# Patient Record
(demographics unavailable — no encounter records)

---

## 2018-01-18 NOTE — RAD
CHEST 2 VIEWS:

 

HISTORY: 

Atrial fibrillation.

 

COMPARISON: 

Chest radiograph 3/20/17.

 

FINDINGS: 

The heart size is enlarged.  The hilum is enlarged bilaterally.  No pneumothorax.  No large effusion.
  Mild pulmonary venous congestion.

 

IMPRESSION: 

Cardiomegaly, pulmonary arterial hypertension, and mild pulmonary venous congestion.

 

POS: [facility]

## 2018-05-22 NOTE — RAD
PELVIS 1 VIEW:

 

HISTORY: 

Fall.  Trauma.

 

COMPARISON: 

None.

 

FINDINGS: 

Limited evaluation of the left inferior suprapubic rami.  Questionable left superior pubic ramus frac
ture.  Iliac wings appear to be intact.  On the single projection, contour of both femoral heads are 
maintained.  Hip joint space is symmetric.  Postsurgical changes of the lower lumbar spine are noted.


 

IMPRESSION: 

Left superior pubic ramus fracture suspected.  Evaluation of pelvis is limited.  Additional imaging i
s warranted.

 

POS: [facility]

## 2018-07-20 NOTE — HP
DATE OF ADMISSION:  2018

 

DATE OF HISTORY AND PHYSICAL:  2018

 

HISTORY OF PRESENT ILLNESS:  The patient is a very pleasant 72-year-old white male electively admitte
d by Dr. Pepe to [facility] for a total right knee.  Postoperatively, he has done fairly we
ll, but his knee is somewhat red and inflamed.  They have been packing it on ice, she is not on any a
ntibiotics and no cultures have grown anything.

 

SUBJECTIVE:  The patient has been moved here because he needs further physical therapy and occupation
al therapy.

 

PAST MEDICAL HISTORY:  Reveals patient has:

1.  Diabetes.

2.  Hypertension.

3.  An enlarged blood vessel in his head that pushes against nerve and gives him facial nerve pain.

4.  Coronary artery disease.

5.  Atrial fibrillation.

 

PAST SURGICAL HISTORY:

1.  Back surgery.

2.  Total left knee on 2012.

3.  ESS on 2014.

4.  Cholecystectomy on 2015.

5.  Total right knee on 2018.

 

FAMILY HISTORY:  Reveals patient's father  in his 50s of a gunshot wound.  Patient's mother  
at 97, but she had heart disease, cancer.  The patient has 3 sons and 3 daughters that are all health
y.

 

SOCIAL HISTORY:  Reveals patient is a nonsmoker, does not drink, does not use any drugs.

 

ALLERGIES:

1.  Patient is on Tradjenta, which is critical.

2.  Invokana, urinary tract infection side effects.

3.  Actos for fluid retention.

 

REVIEW OF SYSTEMS:  The patient denies any present ear, nose, or throat problems including headaches 
at this time.  The patient denies chest pain, shortness of breath, cough, cold, or wheezing.  Cardiov
ascular:  The patient denies chest pain, palpitations, racing or skipping heartbeats.  Abdominal symp
toms:  Patient denies nausea, vomiting, diarrhea, constipation, bloody or black tarry stools.  Genito
urinary:  The patient denies frequency, urgency, but he states that his nurse told him that thought s
he saw some blood in his urine, so will get a urinalysis.  Extremities:  Reveal the patient complains
 of right knee pain.  Neurologic:  The patient denies any symptoms except occasional headache and fol
lows a neurologist for that.  Skin:  Patient denies any new skin rashes or skin lesions.  Psychiatric
:  The patient denies any signs or symptoms of anxiety or depressive disorder.

 

PRESENT MEDICATIONS:  Reveal the patient is on the following medications:

1.  Amiodarone 100 mg b.i.d.

2.  Aspirin 81 mg each day with food.

3.  Vitamin D3 of 2000 units once a day.

4.  Furosemide 20 units up to twice a day p.r.n. 2+ edema or more.

5.  Gabapentin 300 mg b.i.d.

6.  Hydrocodone 1 tab q.4 hours for 1-5 pain and 2 tabs q.4 hours for 6-10 pain, not to exceed 3000 m
g of acetaminophen.

7.  Lantus 25 mg subcu b.i.d.

8.  Lisinopril 20 mg each morning.

9.  Humalog sliding scale aggressive.

10.  Metformin 1000 mg b.i.d. with meals.

11.  Oxcarbazepine which is Trileptal 300 mg twice a day.

12.  Xarelto 20 mg each morning.

13.  Rosuvastatin 10 mg each evening.

 

PHYSICAL EXAMINATION:

GENERAL:  This is a well-developed, well-nourished, obese white male in no apparent distress at this 
time.

HEENT:  Reveals normocephalic, nontraumatic cranium.  Pupils are equally round and reactive.  Extraoc
ular movements are intact.  Nose and throat are slightly dry.

NECK:  Supple, without mass, nodes, or bruits.

CHEST:  Clear to auscultation.  No rales, no rhonchi, no wheezes are heard.

HEART:  Reveals what seems to be a regular rate and rhythm, but he may be in a very well controlled a
trial fibrillation because he does have a history of atrial fib.

ABDOMEN:  Soft, nontender, without organomegaly, normal bowel sounds are noted.  No rebound or guardi
ng is noted.  Bowel sounds are heard in all 4 quadrants.

GENITOURINARY:  Deferred.

EXTREMITIES:  No clubbing, cyanosis.  The patient has +1 edema in bilateral lower extremities.  The p
atient has right knee is examined and has some redness and fullness and slightly warm.  We will follo
w up for drainage.  He does not have staples.  He has glue.

NEUROLOGIC:  The patient is intact.

 

ASSESSMENT:

1.  Total right knee.

2.  Diabetes.

3.  Hypertension.

4.  Coronary artery disease.

5.  Nerve pain.

6.  Atrial fibrillation.

7.  Recent total right knee surgery.

8.  Pain management.

9.  Generalized weakness.

 

PLAN:

1.  Continue present medications as ordered.

2.  Monitor the patient's diabetes with Accu-Cheks a.c. and at bedtime.

3.  Monitor the patient's blood pressure closely.

4.  Monitor the patient's rate for RVR.

5.  Pain management.

6.  Physical therapy and occupational therapy.

7.  Diabetic diet.

## 2018-07-20 NOTE — PRG
DATE OF SERVICE:  07/20/2017

 

HISTORY OF PRESENT ILLNESS:  Mr. Morse is a very pleasant 72-year-old white male that had an elective 
total right knee done by Dr. Pepe.  Postoperatively, he was transferred to [facility] for physical therapy and occupational therapy.

 

The patient is still concerned that his knee is red with a little drainage.  It is red, it is not any
 redder than it was yesterday.  We have been continued with some cool packs on it.

 

Labs this morning revealed white count 6900 with no shift.  Urinalysis was done this morning which sh
owed 7-10 rbcs, but no wbcs.  Sodium is 132, potassium 4.3, BUN 14, creatinine 0.83.  Sugar this morn
ing was 189.

 

Patient does complain of some knee pain.  Seems like the pain medicine does work for him, though.

 

OBJECTIVE:

VITAL SIGNS:  Reveal blood pressure this morning 143/66, pulse 67-72, respirations 18-20, O2 sat 92%-
94% on room air.  T-max is 98.7.:

GENERAL:  This is a well-developed, well-nourished, somewhat obese white male in no apparent distress
 at this time.

HEENT:  Reveals normocephalic, nontraumatic cranium.  Pupils are equally round and reactive.  Extraoc
ular movements intact.  Nose and throat are slightly dry, but clear.

NECK:  Supple, without mass, nodes or bruits.

LUNGS:  Chest is clear to auscultation.  No rales, rhonchi or wheezes are heard.

CARDIOVASCULAR:  Reveals a regular rate controlled atrial fibrillation rhythm.

ABDOMEN:  Soft, nontender, without organomegaly, normal bowel sounds are noted.  No rebound or guardi
ng is noted.  Bowel sounds are heard in all quadrants.

GENITOURINARY:  Deferred.

EXTREMITIES:  Reveal no clubbing or cyanosis.  The patient does continue to have 1+ edema bilaterally
 in lower extremities.  The patient's right knee is examined and does have a little redness and fulln
ess, but not any worse than yesterday.  We are following for drainage if he starts having drainage.  
We will do a culture.

NEUROLOGIC:  The patient is oriented to person, place, and time.

 

IMPRESSION:

1.  Recent postoperative total right knee.

2.  Diabetes, somewhat elevated sugars at this time.

3.  Hypertension, stable.

4.  Coronary artery disease.

5.  Nerve pain.

6.  Atrial fibrillation.

7.  Recent total right knee surgery.

8.  Pain management.

9.  Generalized weakness.

 

PLAN:

1.  Continue present meds.

2.  Monitor the patient's diabetes with Accu-Cheks a.c. and at bedtime.

3.  Monitor patient's blood pressure closely.

4.  Monitor the patient's knee for redness and inflammation and cellulitis.

5.  Monitor the patient's right forearm _____.

6.  Stress ulcer prophylaxis.

7.  Decubitus precautions.

8.  Deep venous thrombosis prophylaxis.

9.  Continue physical therapy and occupational therapy.

10.  Diabetic diet.

## 2018-07-21 NOTE — PRG
DATE OF SERVICE:  07/21/2018

 

DATE OF ADMISSION:  07/19/2018

 

SUBJECTIVE:  Mr. Morse is a 72-year-old white male that had a total right knee done by Dr. Pepe at [facility].  Postoperatively, he did well and was transferred here.

 

The patient's daughters came in yesterday were upset because his knee was still red.  It has been red
 since he had a surgery.  They wanted to bring him to [place] for Dr. Pepe to see him.  They did conta
ct Dr. Pepe's office, he said to bring him if it is still red today.

 

His blood work reveals white count is actually 6,900 yesterday, it has gone down to 6,700 today.  He 
states his daughters are just overly active and worried.

 

My major concern is that sugars are still high from 198, 165, 209 and 169 this morning.  I did increa
se his Lantus or Levemir from 25 units subcu to 30 units subcu b.i.d.  We also increased his Lasix fr
om p.r.n. to 40 units every morning starting tomorrow morning.  He did get a dose of 20 this morning.


 

OBJECTIVE:

VITAL SIGNS:  This morning reveal blood pressure 158/69, pulse 94, respirations 16-22 on room air, O2
 sat 94%, T-max 98.0, pulse 77-79.

GENERAL:  He is a well-developed, well-nourished, obese white male in no apparent distress at this ti
me.

HEENT:  Reveals normocephalic, nontraumatic cranium.  Pupils are round and reactive.  Extraocular mov
ements are intact.  Nose and throat are slightly dry, but clear.

NECK:  Supple, without mass, nodes or bruits.

CHEST:  Clear to auscultation.  No rales, rhonchi, wheezes or cough is heard.

HEART:  Reveals a regular rate and rhythm.  Patient does have a history of atrial fibrillation, but i
t seems to be rate controlled and regular today.

ABDOMEN:  Obese, soft and nontender, without organomegaly.  Normal bowel sounds are noted.  No reboun
d or guarding is noted.

:  Deferred.

EXTREMITIES:  Reveal right knee seems to have a little less redness.

NEUROLOGIC:  Has quite a bit of swelling, so we will give him his Lasix 20 this morning, but we will 
give him 40 tomorrow morning.  Does not seem to be as red this morning, but it gets redder every afte
rnoon following his workouts.

NEUROLOGIC:  The patient is oriented to person, place, and time.

 

IMPRESSION:

1.  Recent postoperative total right knee by Dr. Pepe.

2.  Diabetes, still elevated, increase his Levemir to 30.

3.  Hypertension, which is still a little elevated.

4.  Coronary artery disease.

5.  Nerve pain.

6.  Atrial fibrillation.

7.  Recent total right knee.

8.  Pain management.

9.  Generalized weakness.

 

PLAN:

1.  Lasix 40 each morning starting tomorrow.

2.  Monitor his edema closely.

3.  Monitor patient diabetes with Accu-Cheks a.c. and at bedtime.

4.  Monitor the patient's blood pressure closely.

5.  Monitor the patient's knee for redness, inflammation and we will sent to [place] if it is gett
ing worse.

6.  Continue CBC daily.

7.  Monitor the patient's right forearm.

8.  Stress ulcer prophylaxis.

9.  Decubitus precautions.

10.  Deep venous thrombosis prophylaxis.

11.  Continue physical therapy and occupational therapy.

12.  Diabetic diet.

## 2018-07-22 NOTE — PRG
DATE OF SERVICE:  07/22/2018

 

SUBJECTIVE:  Mr. Morse is a very pleasant 72-year-old white male that had total right knee done by Dr. Pepe at [facility].  Postoperatively, he did well and was transferred here.  The patient's
 daughters who came in yesterday were upset, because his knee was red.  We did blood work and it was 
better than it had been.  We did blood work this morning, his white count is a little bit elevated fr
om 6.7-8.0.  His knee is not so red, but his lower leg looks like it may have an early cellulitis.  EVERARDO nunes does have a culture that is pending, but thus far has no white cells and no bacteria seen on Gram s
tain.  His sugars are still high, 169, 193, 233 and 220.

 

PHYSICAL EXAMINATION:

VITAL SIGNS:  This morning, blood pressure were 138/61, pulse 63-67, respirations 20, O2 sat 92%-93%.
  T-max 98.7.

GENERAL:  This is a well-developed, well-nourished, morbidly obese white male in no apparent distress
 at this time.  He had a recent total knee replacement on the right side and is deconditioned.

HEENT:  Reveals normocephalic, nontraumatic cranium.  Pupils are equal, round, and reactive.  Extraoc
ular movements intact.  Nose and throat are slightly dry, but clear.

NECK:  Supple, without mass, nodes or bruits.

CHEST:  Clear to auscultation.  No rales, rhonchi, wheezes or cough is heard.

CARDIOVASCULAR:  Heart reveals a regular rate and rhythm without murmurs, gallops or rubs.

The patient does have a history of atrial fibrillation, but seems rate controlled and regular today. 
 ABDOMEN:  Soft, nontender, without organomegaly.  Normal bowel sounds are noted.  No rebound or guar
ding is noted.

GENITOURINARY:  Deferred.

EXTREMITIES:  Reveal right knee is less red, but the lower leg is much more red.  It looks like he ma
y have an early cellulitis there.  Neurologically, we were giving him Lasix every morning, 40 mg.  He
 does still have some trace edema.

NEUROLOGIC:  The patient is oriented to person, place, and time.

 

ASSESSMENT:

1.  Recent postop total right knee by Dr. Pepe.

2.  Early cellulitis of lower leg, below the knee.

3.  Diabetes, elevated, his Levemir has been bumped to 30 units, continue to watch that.

4.  Hypertension, which is stable.

5.  Coronary artery disease.

6.  Nerve pain.

7.  Atrial fibrillation.

8.  Recent total right knee.

9.  Pain management.

10.  Generalized weakness.

 

PLAN:

1.  We will start the patient on Lasix 40 mg each morning that started this morning.

2.  Start the patient on Bactrim-DS 1 pill b.i.d. for his lower leg cellulitis.

3.  Monitor his edema closely.

4.  Monitor the patient's Accu-Cheks at a.c. and at bedtime.

5.  Monitor the patient's blood pressure closely.

6.  Continue to monitor the patient's knee for redness, inflammation.

7.  Continue CBC daily.

8.  Monitor the patient's right forearm.

9.  Stress ulcer prophylaxis.

10.  Decubitus precautions.

11.  Deep venous thrombosis prophylaxis.

12.  Continue physical therapy and occupational therapy.

13.  Diabetic diet.

## 2018-07-23 NOTE — PRG
DATE OF SERVICE:  07/23/2018

 

SUBJECTIVE:  Mr. Morse is a 72-year-old very pleasant white male that had a total right knee done by JOE Pepe at [facility].  Postoperatively, he did well, but has had some problems with rednes
s in his knee and it being kind of wet and oozing.  We are being follow him very closely and his bloo
d work each day reminds with a white count that was unremarkable.  Culture was done, which was not gr
owing anything thus far.  His sugars have been high, so we have been adjusting his medications.  He h
as no complaints today.

 

PHYSICAL EXAMINATION:

VITAL SIGNS:  Today reveal blood pressure this morning was 164/70, pulse 67-68, respirations 18-20, O
2 sat 93%-94% on room air.  Patient does wear BiPAP at night.  T-max is 98.2

GENERAL:  This is a well-developed, well-nourished, very pleasant white male in no apparent distress 
at this time.

HEENT:  Reveals normocephalic, nontraumatic cranium.  The pupils are equally round and reactive.  Ext
raocular movements intact.  Nose and throat are slightly dry.

NECK:  Supple, without mass, nodes or bruits.

LUNGS:  Chest is clear to auscultation.  No rales, rhonchi, wheezes or cough is noted.

HEART:  Reveals a regular rate and rhythm, but the patient does have a history of atrial fibrillation
.  He is rate controlled at this time.

ABDOMEN:  Soft, nontender, without organomegaly, normal bowel sounds are noted.  No rebound or guardi
ng is noted.

GENITOURINARY:  Deferred.

EXTREMITIES:  Reveal no clubbing, cyanosis or edema. The patient's right knee is much less red below 
the knee and looks much better and has much less swelling.  He states he feels better.  He does have 
some trace edema.

NEUROLOGIC:  The patient is oriented to person, place, and time.

 

ASSESSMENT:

1.  Recent postop total right knee done by Dr. Pepe.

2.  Early cellulitis of the lower leg below the knee.

3.  Edema.

4.  Diabetes, elevated, so his Levemir has been bumped to 30 units.

5.  Hypertension.

6.  Coronary artery disease.

7.  Nerve pain.

8.  History of atrial fibrillation.

9.  Pain management.

10.  Generalized weakness.

 

PLAN:

1.  We will continue the patient on Lasix 40 mg each morning.

2.  The patient is on Bactrim-DS 1 pill twice a day for his lower leg cellulitis for 7 days.

3.  We will monitor his edema closely.

4.  We will monitor the patient Accu-Cheks a.c. and at bedtime.

5.  Adjust his Levemir as needed.

6.  We will monitor the patient's blood pressure closely.

7.  Continue to monitor the patient's knee for redness, inflammation, cellulitis.

8.  We will monitor the patient's right forearm.

9.  Stress ulcer prophylaxis.

10.  Decubitus precautions.

11.  Deep venous thrombosis prophylaxis.

12.  Continue physical therapy and occupational therapy.

## 2018-07-24 NOTE — PRG
DATE OF SERVICE:  07/24/2018

 

DATE OF ADMISSION:  07/19/2018

 

HISTORY OF PRESENT ILLNESS:  Mr. Morse is a well-developed, well-nourished, somewhat obese white male 
that had a total right knee done by Dr. Pepe at [facility].  Postoperatively, he was very w
eak and he was transferred to [facility] for physical therapy and occupational therap
y.  He did have cultures done on his knee, since it was slightly red and looks like he has cellulitis
 and he was started on Bactrim-DS twice a day.  Patient states he has no complaints today, feels like
 his leg is doing much better.

 

PHYSICAL EXAMINATION:

VITAL SIGNS:  Reveal blood pressure 158/69, pulse 64, respirations 20, O2 sat 93% on room air, T-max 
98.

GENERAL:  This is a well-developed, well-nourished, obese white male in no apparent distress at this 
time.

HEENT:  Reveals normocephalic, nontraumatic cranium.  Pupils equal, round, and reactive.  Extraocular
 movements intact.  Nose and throat are slightly dry.

NECK:  Supple without mass, nodes, or bruits.

LUNGS:  Chest is clear to auscultation.  No rales, rhonchi, or wheezes are noted.  No cough is noted.


HEART:  Reveals a regular rate and rhythm.  The patient does have a history of atrial fib in the past
.

ABDOMEN:  Obese, soft, nontender.  Normal bowel sounds are noted in all 4 quadrants.  No rebound or g
uarding is noted.

:  Deferred.

EXTREMITIES:  Reveal no clubbing, cyanosis, or edema.  Right knee is much less swollen today, still s
lightly pink but much improved over the last 2-3 days.

NEUROLOGIC:  The patient is oriented to person, place, and time.

 

ASSESSMENT:

1.  Postop total right knee done by Dr. Lancaster.

2.  Cellulitis of the lower leg below-the-knee.

3.  Edema.

4.  Diabetes, elevates his Levemir has been bumped to 30 units.

5.  Hypertension.

6.  Coronary artery disease.

7.  Nerve pain.

8.  History of atrial fibrillation.

9.  Pain management.

10.  Generalized weakness.

 

PLAN:

1.  Continue the patient on Lasix 40 mg each morning.

2.  Continue the patient on Bactrim-DS 1 p.o. b.i.d. for a total of 7 days.

3.  Monitor the patient's edema.

4.  Monitor the patient diabetes with Accu-Cheks a.c. and at bedtime.

5.  Adjust the patient's Levemir as needed.

6.  Monitor the patient's blood pressure closely.

7.  Continue to monitor the patient need for redness, inflammation, cellulitis.

8.  Monitor the patient's right forearm for inflammation.

9.  Stress ulcer prophylaxis.

10.  Decubitus precautions.

11.  Deep venous thrombosis prophylaxis.

12.  Continue PT and OT.

## 2018-07-25 NOTE — PRG
DATE OF SERVICE:  07/25/2018

 

DATE OF ADMISSION:  07/19/2018

 

HISTORY OF PRESENT ILLNESS:  Mr. Morse is a well-developed, well-nourished, very pleasant white male t
hat had a total right knee done at [facility] by Dr. Pepe.  Postoperatively, he is weak and
 transferred to [facility] for PT and OT.  He did have some redness and some oozing a
round his knee.  Cultures were done which were unremarkable.  The patient was started on Bactrim bobby
use of redness.  He seems to be doing much better.  He has no complaints today.  He feels like his le
g is much better.

 

PHYSICAL EXAMINATION:

VITAL SIGNS:  Reveal blood pressure today 144/65, pulse 64-58, respirations 16-20, O2 sat 94% on room
 air, T-max 98.0.

GENERAL:  This is a well-developed, well-nourished, obese white male in no apparent distress at this 
time.

HEENT:  Reveals normocephalic, nontraumatic cranium.  Pupils are equally round and reactive.  Extraoc
ular movements are intact.  Nose and throat are slightly dry, but clear.

NECK:  Supple, without mass, nodes, bruits.

CHEST:  Clear to auscultation.  No rales, rhonchi or wheezes are heard.

HEART:  Reveals a regular rate and rhythm without murmurs, gallops or rubs.  Patient does have a hist
ory of atrial fib in the past.

ABDOMEN:  Morbidly obese, soft, nontender, without organomegaly.  No rebound or guarding is noted.

:  Deferred.

EXTREMITIES:  Reveal right knee is much less swollen and much less red.

NEUROLOGIC:  The patient is doing well.

 

LABORATORY DATA:  Microbiology reveals no WBCs, no organisms seen.

 

I did talk with physical therapy and they states he is doing well and walking well and has much less 
swelling or redness.  They agree that he most likely will be able to be discharged on Friday.

 

IMPRESSION:

1.  Total right knee done by Dr. Pepe.

2.  Cellulitis of lower leg below the knee, much improved.

3.  Edema.

4.  Diabetes with sugars improving.

5.  Hypertension.

6.  Coronary artery disease.

7.  Nerve pain.

8.  History of atrial fibrillation.

9.  Pain management.

10.  Generalized weakness.

 

PLAN:

1.  Continue patient on Lasix 40 mg daily.

2.  Continue patient on Bactrim DS 1 twice a day for 7 days.

3.  Monitor the patient's edema.

4.  We will continue to monitor the patient's Accu-Cheks a.c. and at bedtime.

5.  Continue to adjust Levemir as needed.

6.  Monitor the patient's blood pressure closely.  We will continue to monitor the patient's leg.

7.  Most likely, the patient will be discharged on Friday afternoon.

8.  Stress ulcer prophylaxis.

9.  Decubitus precautions.

10.  Deep venous thrombosis prophylaxis.

11.  Continue PT and OT.

## 2018-07-26 NOTE — PRG
DATE OF SERVICE:  07/26/2018

 

HISTORY OF PRESENT ILLNESS:  Mr. Morse is a 72-year-old white male that had a total right knee done at
 [facility] by Dr. Pepe.  Postoperatively, he is weak and was transferred to [facility] for physical therapy and occupational therapy.  He had kind of a wet knee and some drain
age.  Cultures were done which have not grown anything.  He did have some redness below the knee and 
was started on Bactrim because of suspected cellulitis.  Seems to be doing much better.

 

The patient states he is doing well.  I would like to go home and he is scheduled to go home tomorrow
.

 

He thinks his leg is much better, actually this afternoon it is slightly more swollen and a little bi
t red.  He states he did not walk as far today.

 

Vital signs reveal blood pressure 143/88 which is little higher for him.  Pulse 63-65, respirations 1
8-20, O2 sat 94%-97%.  T-max 98.1.

 

Laboratories today reveal white count 7200 with a hemoglobin 10.8; hematocrit 33.9, which is improvin
g.  Platelet count is 430,000.

 

Sodium is 131, potassium 5.0, chloride 97, carbon dioxide 25 with a BUN of 24, creatinine 1.19.  Fast
ing sugar yesterday morning 225, before lunch 166, before supper 158, before bedtime 190.  The patien
t was bumped up to Levemir 40 b.i.d.  He continues to remain in the 200s and high 90s.

 

PHYSICAL EXAMINATION:

GENERAL:  He is a well-developed, well-nourished, obese white male, in no apparent distress at this t
bin.

HEENT:  Reveals normocephalic, nontraumatic cranium.  Pupils equally round and reactive.  Extraocular
 movements intact.  Nose and throat are slightly dry.

NECK:  Supple without mass, nodes or bruits.

CHEST:  Clear to auscultation.  No rales, rhonchi or wheezes are heard.

CARDIOVASCULAR:  Reveals a regular rate and rhythm without murmurs, gallops or rubs.

ABDOMEN:  Soft, nontender, morbidly obese.  No rebound or guarding is noted.  Normal bowel sounds are
 noted.

:  Deferred.

EXTREMITIES:  Reveal right knee is much less swollen, much less red than admission, but it is still l
ittle more swollen today and later today that was tomorrow.  We will reevaluate tomorrow.

NEUROLOGIC:  The patient is doing well.

 

IMPRESSION:

1.  Total right knee done by Dr. Pepe.

2.  Cellulitis lower leg, much improved, on antibiotics.

3.  Edema.

4.  Diabetes with sugars improving.

5.  Increase Levemir to 40 units b.i.d.

6.  Hypertension.

7.  Coronary artery disease.

8.  Nerve pain.

9.  History of atrial fibrillation.

10.  Pain management.

11.  Generalized weakness.

 

PLAN:

1.  Continue the patient on Lasix 40 mg a day.

2.  Continue the patient on Bactrim-DS 1 twice a day for 7 days.

3.  Monitor the patient's edema.

4.  Continue to monitor the patient's Accu-Cheks a.c.  and at bedtime.

5.  Adjust the patient's Levemir to 40 units b.i.d.

6.  Monitor the patient's blood pressure closely.

7.  The patient to be discharged tomorrow after lunch.

8.  Stress ulcer prophylaxis.

9.  Decubitus precaution.

10.  Deep venous thrombosis prophylaxis.

11.  Continue physical therapy and occupational therapy.

## 2018-07-28 NOTE — DIS
DATE OF ADMISSION:  07/19/2018

 

DATE OF DISCHARGE:  07/27/2018

 

HISTORY OF PRESENT ILLNESS:  Mr. Morse is a very pleasant 72-year-old white male that had an elective 
total knee done by Dr. Pepe at [facility].  Postoperatively, he did well, but his knee was 
red and inflamed.  He was very weak.  He was transferred here to continue follow closely and for phys
ical therapy and occupational therapy.

 

SUBJECTIVE:  The patient has actually done very well.  He did develop a lower leg cellulitis and we d
id start him on Bactrim-DS for a full 10 days and is doing very well.  He has reached maximum medical
 benefit and is ready for discharge at this time.

 

DISCHARGE MEDICATIONS:  Include the following,

1.  Aspirin 81 mg daily.

2.  Xarelto 20 mg daily.

3.  Amiodarone 100 mg b.i.d.

4.  Lisinopril 20 mg daily.

5.  Rosuvastatin 10 mg every evening.

6.  Furosemide 40 mg once a day.

7.  Gabapentin 300 mg t.i.d.

8.  Lantus 40 mg subcu b.i.d.

9.  Humalog sliding scale, which was aggressive.

10.  Metformin 1000 mg twice a day with meals.

11.  Oxcarbazepine, which is Trileptal 300 mg twice a day.

12.  Vitamin D3 of 2000 units once a day.

13.  Hydrocodone which he has not been taking.

 

PHYSICAL EXAMINATION:

VITAL SIGNS:  Today reveal blood pressure 140/63, pulse 66-63, respirations 20-22, O2 sat 96% on room
 air, T-max 98.2.

GENERAL:  Reveals a well-developed, well-nourished, obese white male in no apparent distress at this 
time.

HEENT:  Reveals normocephalic, nontraumatic cranium.  Pupils equal, round, reactive.  Extraocular mov
ements intact.  Nose:  There is slight dry, but clear.

NECK:  Supple, without masses, nodes, or bruits.

CHEST:  Clear to auscultation.  No rales, rhonchi or wheezes are heard.

HEART:  Reveals a regular rate and rhythm without murmurs, gallops or rubs.  The patient does have a 
history of atrial fibrillation that has been rate controlled.

ABDOMEN:  Obese, soft, nontender, without organomegaly, normal bowel sounds are noted.  No rebound or
 guarding is noted.

GENITOURINARY:  Deferred.

EXTREMITIES:  No clubbing, cyanosis.  The patient has 1+ edema in that left lower leg.  The redness i
s much improved.  He has some drainage at the very bottom that continuous, but is serous drainage.  H
e is not red, it is not full.  Does not feel like there is an abscess there.  It is not warm.  He irby
s not have staples, he has glue.

NEUROLOGICAL:  Intact.

 

ASSESSMENT:

1.  Total right knee.

2.  Diabetes been out of control, much improved.

3.  Hypertension, stable.

4.  Coronary artery disease.

5.  Neuropathy.

6.  Atrial fibrillation.

7.  Pain management.

8.  Generalized weakness.

9.  Cellulitis of the lower leg.

 

PLAN:

1.  Continue Bactrim-DS and he has 9 more pills.  He will continue taking it tonight and then twice a
 day.

2.  Continue to follow the patient's Accu-Cheks a.c. and at bedtime.

3.  Continue his Lantus 40 mg twice a day.

4.  Monitor the patient's blood pressure at home.

5.  Follow up with his orthopedic surgeon per protocol.

6.  The patient will continue on [facility] for physical therapy and occupational therapy 
to watch his leg per his request.  He states that in 2-3 weeks, he most likely will go to [place] 
for outpatient physical therapy.

## 2019-01-30 NOTE — RAD
PA AND LATERAL CHEST:

 

History: Atrial fibrillation. 

 

FINDINGS: 

Comparison made with exam of 1-18-18. 

 

The heart is enlarged. The lungs are expanded without focal areas of consolidation, pneumothorax, fra
nk pulmonary edema or pleural effusions. There are degenerative changes in the spine. 

 

IMPRESSION: 

No acute process. 

 

POS: OFF

## 2020-03-31 NOTE — RAD
PA AND LATERAL VIEWS OF THE CHEST:

 

HISTORY: 

Atrial fibrillation.

 

COMPARISON: 

10/14/2019.

 

FINDINGS: 

The heart size is enlarged.  The lungs are well expanded without lobar consolidation, pneumothoraces,
 chanel pulmonary edema, or pleural effusions.  There are degenerative changes in the spine.

 

IMPRESSION: 

No acute process.

 

POS: ELLEN